# Patient Record
(demographics unavailable — no encounter records)

---

## 2024-10-16 NOTE — PHYSICAL EXAM
[Chaperone Present] : A chaperone was present in the examining room during all aspects of the physical examination [[id number]] : A chaperone was present during the pelvic exam. [FreeTextEntry2] : kellie [Appropriately responsive] : appropriately responsive [Alert] : alert [No Acute Distress] : no acute distress [No Lymphadenopathy] : no lymphadenopathy [Regular Rate Rhythm] : regular rate rhythm [No Murmurs] : no murmurs [Clear to Auscultation B/L] : clear to auscultation bilaterally [Soft] : soft [Non-tender] : non-tender [Non-distended] : non-distended [No HSM] : No HSM [No Lesions] : no lesions [No Mass] : no mass [Oriented x3] : oriented x3 [Examination Of The Breasts] : a normal appearance [No Masses] : no breast masses were palpable [Labia Majora] : normal [Labia Minora] : normal [Normal] : normal [Uterine Adnexae] : normal [Declined] : Patient declined rectal exam

## 2024-10-16 NOTE — DISCUSSION/SUMMARY
[FreeTextEntry1] : 23-year-old G0, P0 presents with irregular menstrual cycle and occasional hirsutism.  Physical exam shows mild obesity.  Pap GC chlamydia sent and lab work ordered to rule out PCOS.  Diet and exercise stressed.  Safe sex practices discussed.  Return in 4 to 6 weeks for follow-up.

## 2024-10-16 NOTE — HISTORY OF PRESENT ILLNESS
[Y] : Patient is sexually active [N] : Patient denies prior pregnancies [Menarche Age: ____] : age at menarche was [unfilled] [FreeTextEntry1] : 23-year-old G0, P0 last menstrual cycle was September 6, 2024 presents for GYN evaluation.  History of irregular menstrual cycle and hirsutism.  She is sexually active and desires nothing for contraception because she is in the process of  with her partner. [PGHxTotal] : 0 [PGHxFullTerm] : 0 [PGHxPremature] : 0 [PGHxAbortions] : 0 [PGHxABInduced] : 0 [PGHxABSpont] : 0 [PGHxEctopic] : 0 [PGHxMultBirths] : 0

## 2024-11-26 NOTE — PLAN
[FreeTextEntry1] : 23-year-old  with irregular menstrual cycle and lab work is indicative of polycystic ovarian syndrome.  Urine pregnancy test is negative so patient has been started on birth control pills.  Risks and benefits had been discussed.  Return in 3 months for follow-up.  All questions answered.

## 2024-11-26 NOTE — HISTORY OF PRESENT ILLNESS
[FreeTextEntry1] : 23-year-old  presents with irregular menstrual cycle, and hirsutism.  Lab work is indicative of PCOS.  Patient desires oral contraceptive pills to regulate her menstrual cycle.